# Patient Record
Sex: FEMALE | Race: WHITE | Employment: OTHER | URBAN - METROPOLITAN AREA
[De-identification: names, ages, dates, MRNs, and addresses within clinical notes are randomized per-mention and may not be internally consistent; named-entity substitution may affect disease eponyms.]

---

## 2017-10-19 ENCOUNTER — OFFICE VISIT (OUTPATIENT)
Dept: SURGERY | Facility: CLINIC | Age: 82
End: 2017-10-19
Payer: COMMERCIAL

## 2017-10-19 VITALS — DIASTOLIC BLOOD PRESSURE: 78 MMHG | HEART RATE: 69 BPM | SYSTOLIC BLOOD PRESSURE: 153 MMHG

## 2017-10-19 DIAGNOSIS — D05.11 NEOPLASM OF RIGHT BREAST, PRIMARY TUMOR STAGING CATEGORY TIS: DUCTAL CARCINOMA IN SITU (DCIS): Primary | ICD-10-CM

## 2017-10-19 PROCEDURE — 99213 OFFICE O/P EST LOW 20 MIN: CPT | Performed by: SURGERY

## 2017-10-19 NOTE — LETTER
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  She continues to feel well and remain healthy.  She has noted no breast changes. Amada elected to not have a mammogram this year.     REVIEW OF SYSTEMS:  Constitutional:  Negative for chills, fatigue, fever and weight change.  Eyes:  Negative for blurred vision, eye pain and photophobia.  ENT:  Negative for hearing problems, ENT pain, congestion, rhinorrhea, epistaxis, hoarseness and dental problems.  Cardiovascular:  Negative for chest pain, palpitations, tachycardia, orthopnea and edema.  Respiratory:  Negative for cough, dyspnea and hemoptysis.  Gastrointestinal:  Negative for abdominal pain, heartburn, constipation, diarrhea and stool changes.  Musculoskeletal:  Negative for arthralgias, back pain and myalgias.  Integumentary/Breast:  See HPI.   Vitals: /78 (BP Location: Left arm)  Pulse 69  BMI= There is no height or weight on file to calculate BMI.     EXAM:  GENERAL: healthy, alert and no distress   BREAST:  The breasts appear symmetric with no overlying skin changes except for a little edema of the inferior right breast from prior radiation.  The nipples are normal bilaterally.  There is no dimpling of the skin. The right periareolar scar is well healed.  No mass is appreciated in either breast.  The breast tissue is generally pretty soft with a little thickening of the tissue in the right breast.  There is no axillary or supraclavicular lymphadenopathy.  One benign feeling lymph node is palpated in the right axilla and 2 in the left axilla.  CARDIOVASCULAR:  No edema or JVD.  RESPIRATORY: negative findings: no chest deformities noted, no chest wall tenderness, breathing is unlabored.  NECK: Neck supple. No adenopathy.   SKIN: No suspicious lesions or rashes  LYMPH: Normal  cervical lymph nodes     ASSESSMENT:  Amada Conway continues to do very well.  There is no evidence of breast cancer recurrence on clinical breast exam at this time.  She remains extremely healthy and active.  We talked about screening and though I encouraged her to continue to have annual mammograms since she is so healthy, she prefers to have them every other year.     PLAN:  Amada will continue to have annual clinical breast exams as part of her annual exam and have mammograms every other year (her preference).  She will follow up here as needed.     Nadya Bernard MD

## 2017-10-19 NOTE — NURSING NOTE
"Amada Conway's goals for this visit include: annual breast exam   She requests these members of her care team be copied on today's visit information: no    PCP: Rico Andres    Referring Provider:  No referring provider defined for this encounter.    Chief Complaint   Patient presents with     RECHECK     annual breast exam       Initial There were no vitals taken for this visit. Estimated body mass index is 22.03 kg/(m^2) as calculated from the following:    Height as of 10/28/14: 1.581 m (5' 2.25\").    Weight as of 10/8/15: 55.1 kg (121 lb 6.4 oz).  Medication Reconciliation: complete    Do you need any medication refills at today's visit? No    Honey Etienne LPN      "

## 2017-10-19 NOTE — PROGRESS NOTES
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  She continues to feel well and remain healthy.  She has noted no breast changes. Amada elected to not have a mammogram this year.    REVIEW OF SYSTEMS:  Constitutional:  Negative for chills, fatigue, fever and weight change.  Eyes:  Negative for blurred vision, eye pain and photophobia.  ENT:  Negative for hearing problems, ENT pain, congestion, rhinorrhea, epistaxis, hoarseness and dental problems.  Cardiovascular:  Negative for chest pain, palpitations, tachycardia, orthopnea and edema.  Respiratory:  Negative for cough, dyspnea and hemoptysis.  Gastrointestinal:  Negative for abdominal pain, heartburn, constipation, diarrhea and stool changes.  Musculoskeletal:  Negative for arthralgias, back pain and myalgias.  Integumentary/Breast:  See HPI.    Past Medical History:   Diagnosis Date     Arthritis     right shoulder, hands     Blood transfusion during current hospitalisation 10/13    Ulcer     H pylori ulcer 10/13     Malignant neoplasm (H)     right breast DCIS     Menopausal state     Reports at age early 40s.      Osteoporosis        Past Surgical History:   Procedure Laterality Date     BREAST SURGERY      right breast lumpectomy 10/26/10     BREAST SURGERY      Prior removal of fatty tumor just below the left breast.      CARDIAC SURGERY  6-13-13    Mitral valve repair     HEAD & NECK SURGERY      rhinoplasty       Family History   Problem Relation Age of Onset     Breast Cancer Mother      CANCER Brother      brother-bladder cancer     Alcohol/Drug Brother      CANCER Son      son- testicular cancer     CANCER Other      neice- cervical cancer     HEART DISEASE Sister        Social History   Substance Use Topics     Smoking status: Never  Smoker     Smokeless tobacco: Not on file     Alcohol use Yes      Comment: reports approximately 7 times per week, average quantity of 1 drink.        Patient Active Problem List   Diagnosis     DCIS (ductal carcinoma in situ)     Osteoporosis     No Known Allergies  Current Outpatient Prescriptions   Medication Sig Dispense Refill     polyethylene glycol (MIRALAX) powder Take 1 capful by mouth daily. prn       Calcium Carbonate-Vitamin D (CALCIUM 600 + D) 600-400 MG-UNIT tablet Take 1 tablet by mouth 3 times daily.       Glucosamine-Chondroit-Vit C-Mn (GLUCOSAMINE 1500 COMPLEX) CAPS Take  by mouth. Daily.        Multiple Vitamins-Minerals (CENTRUM PO) Take  by mouth. Daily       Diphenhydramine-APAP, sleep, (TYLENOL PM EXTRA STRENGTH PO) Take  by mouth. As needed for sleep       Vitals: /78 (BP Location: Left arm)  Pulse 69  BMI= There is no height or weight on file to calculate BMI.    EXAM:  GENERAL: healthy, alert and no distress   BREAST:  The breasts appear symmetric with no overlying skin changes except for a little edema of the inferior right breast from prior radiation.  The nipples are normal bilaterally.  There is no dimpling of the skin. The right periareolar scar is well healed.  No mass is appreciated in either breast.  The breast tissue is generally pretty soft with a little thickening of the tissue in the right breast.  There is no axillary or supraclavicular lymphadenopathy.  One benign feeling lymph node is palpated in the right axilla and 2 in the left axilla.  CARDIOVASCULAR:  No edema or JVD.  RESPIRATORY: negative findings: no chest deformities noted, no chest wall tenderness, breathing is unlabored.  NECK: Neck supple. No adenopathy.   SKIN: No suspicious lesions or rashes  LYMPH: Normal cervical lymph nodes    ASSESSMENT:  Amada Conway continues to do very well.  There is no evidence of breast cancer recurrence on clinical breast exam at this time.  She remains extremely healthy  and active.  We talked about screening and though I encouraged her to continue to have annual mammograms since she is so healthy, she prefers to have them every other year.    PLAN:  Amada will continue to have annual clinical breast exams as part of her annual exam and have mammograms every other year (her preference).  She will follow up here as needed.    Nadya Bernard MD    Please route or send letter to:  Primary Care Provider (PCP)

## 2018-10-08 LAB — EJECTION FRACTION: NORMAL %

## 2019-07-25 ENCOUNTER — TRANSFERRED RECORDS (OUTPATIENT)
Dept: HEALTH INFORMATION MANAGEMENT | Facility: CLINIC | Age: 84
End: 2019-07-25

## 2020-03-01 ENCOUNTER — HEALTH MAINTENANCE LETTER (OUTPATIENT)
Age: 85
End: 2020-03-01

## 2020-08-11 LAB
CREAT SERPL-MCNC: 0.7 MG/DL (ref 0.5–1.2)
GFR SERPL CREATININE-BSD FRML MDRD: 79 ML/MIN/1.73
GLUCOSE SERPL-MCNC: 102 MG/DL (ref 65–99)
POTASSIUM SERPL-SCNC: 4.3 MMOL/L (ref 3.5–5)

## 2020-10-21 ENCOUNTER — APPOINTMENT (OUTPATIENT)
Dept: URBAN - METROPOLITAN AREA CLINIC 259 | Age: 85
Setting detail: DERMATOLOGY
End: 2020-10-21

## 2020-10-21 DIAGNOSIS — L57.8 OTHER SKIN CHANGES DUE TO CHRONIC EXPOSURE TO NONIONIZING RADIATION: ICD-10-CM

## 2020-10-21 DIAGNOSIS — D22 MELANOCYTIC NEVI: ICD-10-CM

## 2020-10-21 DIAGNOSIS — D18.0 HEMANGIOMA: ICD-10-CM

## 2020-10-21 DIAGNOSIS — L57.0 ACTINIC KERATOSIS: ICD-10-CM

## 2020-10-21 DIAGNOSIS — L82.1 OTHER SEBORRHEIC KERATOSIS: ICD-10-CM

## 2020-10-21 DIAGNOSIS — L81.4 OTHER MELANIN HYPERPIGMENTATION: ICD-10-CM

## 2020-10-21 DIAGNOSIS — Z71.89 OTHER SPECIFIED COUNSELING: ICD-10-CM

## 2020-10-21 PROBLEM — D22.5 MELANOCYTIC NEVI OF TRUNK: Status: ACTIVE | Noted: 2020-10-21

## 2020-10-21 PROBLEM — D18.01 HEMANGIOMA OF SKIN AND SUBCUTANEOUS TISSUE: Status: ACTIVE | Noted: 2020-10-21

## 2020-10-21 PROCEDURE — OTHER LIQUID NITROGEN: OTHER

## 2020-10-21 PROCEDURE — 99214 OFFICE O/P EST MOD 30 MIN: CPT | Mod: 25

## 2020-10-21 PROCEDURE — OTHER COUNSELING: OTHER

## 2020-10-21 PROCEDURE — 17003 DESTRUCT PREMALG LES 2-14: CPT

## 2020-10-21 PROCEDURE — OTHER MIPS QUALITY: OTHER

## 2020-10-21 PROCEDURE — 17000 DESTRUCT PREMALG LESION: CPT

## 2020-10-21 ASSESSMENT — LOCATION DETAILED DESCRIPTION DERM
LOCATION DETAILED: RIGHT PROXIMAL POSTERIOR UPPER ARM
LOCATION DETAILED: INFERIOR THORACIC SPINE
LOCATION DETAILED: NASAL DORSUM
LOCATION DETAILED: NASAL SUPRATIP
LOCATION DETAILED: SUPERIOR THORACIC SPINE

## 2020-10-21 ASSESSMENT — LOCATION SIMPLE DESCRIPTION DERM
LOCATION SIMPLE: RIGHT POSTERIOR UPPER ARM
LOCATION SIMPLE: NOSE
LOCATION SIMPLE: UPPER BACK

## 2020-10-21 ASSESSMENT — LOCATION ZONE DERM
LOCATION ZONE: TRUNK
LOCATION ZONE: NOSE
LOCATION ZONE: ARM

## 2020-10-21 NOTE — PROCEDURE: LIQUID NITROGEN
Duration Of Freeze Thaw-Cycle (Seconds): 0
Detail Level: Zone
Consent: The patient's consent was obtained including but not limited to risks of crusting, scabbing, blistering, scarring, darker or lighter pigmentary change, recurrence, incomplete removal and infection.
Render In Bullet Format When Appropriate: No
Render Post-Care Instructions In Note?: yes
Total Number Of Aks Treated: 3
Post-Care Instructions: I reviewed with the patient in detail post-care instructions. Patient is to wear sunprotection, and avoid picking at any of the treated lesions. Pt may apply Vaseline to crusted or scabbing areas.

## 2020-10-21 NOTE — PROCEDURE: MIPS QUALITY
Quality 226: Preventive Care And Screening: Tobacco Use: Screening And Cessation Intervention: Patient screened for tobacco use and is an ex/non-smoker
Quality 110: Preventive Care And Screening: Influenza Immunization: Influenza Immunization Ordered or Recommended, but not Administered due to system reason
Quality 111:Pneumonia Vaccination Status For Older Adults: Pneumococcal Vaccination not Administered or Previously Received, Reason not Otherwise Specified
Detail Level: Detailed
Quality 130: Documentation Of Current Medications In The Medical Record: Current Medications Documented
Quality 431: Preventive Care And Screening: Unhealthy Alcohol Use - Screening: Patient screened for unhealthy alcohol use using a single question and scores less than 2 times per year

## 2020-12-14 ENCOUNTER — HEALTH MAINTENANCE LETTER (OUTPATIENT)
Age: 85
End: 2020-12-14

## 2021-01-25 ENCOUNTER — TRANSFERRED RECORDS (OUTPATIENT)
Dept: HEALTH INFORMATION MANAGEMENT | Facility: CLINIC | Age: 86
End: 2021-01-25

## 2021-01-27 ENCOUNTER — TRANSFERRED RECORDS (OUTPATIENT)
Dept: HEALTH INFORMATION MANAGEMENT | Facility: CLINIC | Age: 86
End: 2021-01-27

## 2021-02-12 ENCOUNTER — PATIENT OUTREACH (OUTPATIENT)
Dept: ONCOLOGY | Facility: CLINIC | Age: 86
End: 2021-02-12

## 2021-02-12 ENCOUNTER — TRANSCRIBE ORDERS (OUTPATIENT)
Dept: OTHER | Age: 86
End: 2021-02-12

## 2021-02-12 DIAGNOSIS — C34.90 ADENOCARCINOMA OF LUNG (H): Primary | ICD-10-CM

## 2021-02-12 NOTE — PROGRESS NOTES
We received referral from Dr Black, PCP at [facility] for this pt with newly diagnosed lung adenocarcinoma. Lung bx completed at [facility] 1/27/21 showing adeno. No further work up done, no imaging, etc.    I spoke to patient, she tells me her insurance [employer] will not cover our facility. She will find an in-network provider instead & asks me to cancel her referral. She knows to call back if she needs consult with us later.

## 2021-03-05 ENCOUNTER — TRANSFERRED RECORDS (OUTPATIENT)
Dept: HEALTH INFORMATION MANAGEMENT | Facility: CLINIC | Age: 86
End: 2021-03-05

## 2021-03-08 ENCOUNTER — TRANSFERRED RECORDS (OUTPATIENT)
Dept: HEALTH INFORMATION MANAGEMENT | Facility: CLINIC | Age: 86
End: 2021-03-08

## 2021-03-09 ENCOUNTER — TRANSCRIBE ORDERS (OUTPATIENT)
Dept: OTHER | Age: 86
End: 2021-03-09

## 2021-03-09 ENCOUNTER — MEDICAL CORRESPONDENCE (OUTPATIENT)
Dept: HEALTH INFORMATION MANAGEMENT | Facility: CLINIC | Age: 86
End: 2021-03-09

## 2021-03-09 DIAGNOSIS — C34.92 ADENOCARCINOMA OF LEFT LUNG (H): Primary | ICD-10-CM

## 2021-03-10 ENCOUNTER — DOCUMENTATION ONLY (OUTPATIENT)
Dept: ONCOLOGY | Facility: CLINIC | Age: 86
End: 2021-03-10

## 2021-03-10 NOTE — PROGRESS NOTES
Action    Action Taken 3/10/21:    -Faxed recs from [facility] to [facility] for scanning    -Spoke w/ Rosalinda @ [facility] - pt needs TEO    -LVM for pt re: TEO  8:41 AM

## 2021-03-10 NOTE — PROGRESS NOTES
Action Kely 3/10/2021   Action Taken CSS faxed to   [facility] - FAX: [phone]  [facility]- FAX: [phone]    Update- Received recs from [facility], sent to urgent scanning      Lulu Edge 3/11/2021 10:26AM   Action Taken Per [facility] fax, needing TEO signed by pt  CSS called pt for form to be signed, per pt she does not have a computer. Mailing out forms today   [facility] ([place] -   [facility] -        Action Kely 3/15/2021 1:16PM   Action Taken CSS received a call from pt, pt is wanting to go to [facility] since it's closer to her home. Pt is going to call their insurance to check on if she can go to [facility] and will let me know if she still wants to come here at [facility]. CSS informed pt if she still wants to come here to send and signed TEO's so we can get her recs. Pt will keep me updated where she wants to transfer her 2nd opinion care.

## 2021-04-17 ENCOUNTER — HEALTH MAINTENANCE LETTER (OUTPATIENT)
Age: 86
End: 2021-04-17

## 2021-09-08 ENCOUNTER — APPOINTMENT (OUTPATIENT)
Dept: URBAN - METROPOLITAN AREA CLINIC 259 | Age: 86
Setting detail: DERMATOLOGY
End: 2021-09-08

## 2021-09-08 DIAGNOSIS — L81.4 OTHER MELANIN HYPERPIGMENTATION: ICD-10-CM

## 2021-09-08 DIAGNOSIS — Z71.89 OTHER SPECIFIED COUNSELING: ICD-10-CM

## 2021-09-08 DIAGNOSIS — D22 MELANOCYTIC NEVI: ICD-10-CM

## 2021-09-08 DIAGNOSIS — D18.0 HEMANGIOMA: ICD-10-CM

## 2021-09-08 DIAGNOSIS — L57.0 ACTINIC KERATOSIS: ICD-10-CM

## 2021-09-08 DIAGNOSIS — L57.8 OTHER SKIN CHANGES DUE TO CHRONIC EXPOSURE TO NONIONIZING RADIATION: ICD-10-CM

## 2021-09-08 DIAGNOSIS — Z85.828 PERSONAL HISTORY OF OTHER MALIGNANT NEOPLASM OF SKIN: ICD-10-CM

## 2021-09-08 DIAGNOSIS — L82.1 OTHER SEBORRHEIC KERATOSIS: ICD-10-CM

## 2021-09-08 PROBLEM — D22.5 MELANOCYTIC NEVI OF TRUNK: Status: ACTIVE | Noted: 2021-09-08

## 2021-09-08 PROBLEM — D18.01 HEMANGIOMA OF SKIN AND SUBCUTANEOUS TISSUE: Status: ACTIVE | Noted: 2021-09-08

## 2021-09-08 PROCEDURE — 17000 DESTRUCT PREMALG LESION: CPT

## 2021-09-08 PROCEDURE — 99213 OFFICE O/P EST LOW 20 MIN: CPT | Mod: 25

## 2021-09-08 PROCEDURE — OTHER COUNSELING: OTHER

## 2021-09-08 PROCEDURE — OTHER MIPS QUALITY: OTHER

## 2021-09-08 PROCEDURE — 17003 DESTRUCT PREMALG LES 2-14: CPT

## 2021-09-08 PROCEDURE — OTHER LIQUID NITROGEN: OTHER

## 2021-09-08 ASSESSMENT — LOCATION DETAILED DESCRIPTION DERM
LOCATION DETAILED: LEFT MEDIAL UPPER BACK
LOCATION DETAILED: LEFT INFERIOR MEDIAL UPPER BACK
LOCATION DETAILED: RIGHT SUPERIOR MEDIAL UPPER BACK
LOCATION DETAILED: RIGHT ANTERIOR PROXIMAL UPPER ARM
LOCATION DETAILED: NASAL DORSUM
LOCATION DETAILED: INFERIOR THORACIC SPINE

## 2021-09-08 ASSESSMENT — LOCATION SIMPLE DESCRIPTION DERM
LOCATION SIMPLE: UPPER BACK
LOCATION SIMPLE: RIGHT UPPER BACK
LOCATION SIMPLE: RIGHT UPPER ARM
LOCATION SIMPLE: LEFT UPPER BACK
LOCATION SIMPLE: NOSE

## 2021-09-08 ASSESSMENT — LOCATION ZONE DERM
LOCATION ZONE: ARM
LOCATION ZONE: TRUNK
LOCATION ZONE: NOSE

## 2021-09-08 NOTE — PROCEDURE: LIQUID NITROGEN
Render Post-Care Instructions In Note?: no
Number Of Freeze-Thaw Cycles: 1 freeze-thaw cycle
Total Number Of Aks Treated: 2
Detail Level: Simple
Duration Of Freeze Thaw-Cycle (Seconds): 0
Post-Care Instructions: I reviewed with the patient in detail post-care instructions. Patient is to wear sunprotection, and avoid picking at any of the treated lesions. Pt may apply Vaseline to crusted or scabbing areas.
Consent: The patient's consent was obtained including but not limited to risks of crusting, scabbing, blistering, scarring, darker or lighter pigmentary change, recurrence, incomplete removal and infection.

## 2021-09-08 NOTE — PROCEDURE: MIPS QUALITY
Quality 130: Documentation Of Current Medications In The Medical Record: Current Medications Documented
Quality 111:Pneumonia Vaccination Status For Older Adults: Pneumococcal Vaccination Previously Received
Quality 431: Preventive Care And Screening: Unhealthy Alcohol Use - Screening: Patient not identified as an unhealthy alcohol user when screened for unhealthy alcohol use using a systematic screening method
Quality 226: Preventive Care And Screening: Tobacco Use: Screening And Cessation Intervention: Patient screened for tobacco use and is an ex/non-smoker
Detail Level: Generalized

## 2021-10-02 ENCOUNTER — HEALTH MAINTENANCE LETTER (OUTPATIENT)
Age: 86
End: 2021-10-02

## 2022-05-08 ENCOUNTER — HEALTH MAINTENANCE LETTER (OUTPATIENT)
Age: 87
End: 2022-05-08

## 2023-01-14 ENCOUNTER — HEALTH MAINTENANCE LETTER (OUTPATIENT)
Age: 88
End: 2023-01-14

## 2023-07-22 ENCOUNTER — HEALTH MAINTENANCE LETTER (OUTPATIENT)
Age: 88
End: 2023-07-22